# Patient Record
Sex: FEMALE | Race: WHITE
[De-identification: names, ages, dates, MRNs, and addresses within clinical notes are randomized per-mention and may not be internally consistent; named-entity substitution may affect disease eponyms.]

---

## 2019-01-09 ENCOUNTER — HOSPITAL ENCOUNTER (EMERGENCY)
Dept: HOSPITAL 8 - ED | Age: 49
Discharge: LEFT BEFORE DISCHARGE | End: 2019-01-09
Payer: COMMERCIAL

## 2019-01-09 VITALS — BODY MASS INDEX: 23.46 KG/M2 | HEIGHT: 68 IN | WEIGHT: 154.76 LBS

## 2019-01-09 VITALS — SYSTOLIC BLOOD PRESSURE: 128 MMHG | DIASTOLIC BLOOD PRESSURE: 83 MMHG
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
## 2019-01-09 DIAGNOSIS — S16.1XXA: Primary | ICD-10-CM
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
## 2019-01-09 PROCEDURE — 99281 EMR DPT VST MAYX REQ PHY/QHP: CPT

## 2019-01-09 NOTE — NUR
PA TO SEE PT, PT BECAME VERY ANGRY AND ARGUMENTATIVE, DEMANDING MRI. PA 
ATTEMPTED TO EXPLAIN TO PT THAT MRI IS NOT AN EMERGENCY AND SHOULD BE HANDLED 
OUT PT, HAPPY TO WRITE A REFERAL. PT STORMED OUT OF ROOM.

## 2019-11-14 ENCOUNTER — HOSPITAL ENCOUNTER (EMERGENCY)
Dept: HOSPITAL 8 - ED | Age: 49
Discharge: HOME | End: 2019-11-14
Payer: COMMERCIAL

## 2019-11-14 VITALS — BODY MASS INDEX: 25.23 KG/M2 | WEIGHT: 166.45 LBS | HEIGHT: 68 IN

## 2019-11-14 VITALS — SYSTOLIC BLOOD PRESSURE: 120 MMHG | DIASTOLIC BLOOD PRESSURE: 76 MMHG

## 2019-11-14 DIAGNOSIS — S61.212A: Primary | ICD-10-CM
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
## 2019-11-14 PROCEDURE — 12001 RPR S/N/AX/GEN/TRNK 2.5CM/<: CPT

## 2019-11-14 PROCEDURE — 99283 EMERGENCY DEPT VISIT LOW MDM: CPT

## 2019-11-14 PROCEDURE — 90471 IMMUNIZATION ADMIN: CPT

## 2019-11-14 PROCEDURE — 90715 TDAP VACCINE 7 YRS/> IM: CPT

## 2019-11-16 ENCOUNTER — HOSPITAL ENCOUNTER (EMERGENCY)
Dept: HOSPITAL 8 - ED | Age: 49
Discharge: HOME | End: 2019-11-16
Payer: COMMERCIAL

## 2019-11-16 VITALS — HEIGHT: 68 IN | BODY MASS INDEX: 25.63 KG/M2 | WEIGHT: 169.09 LBS

## 2019-11-16 VITALS — DIASTOLIC BLOOD PRESSURE: 75 MMHG | SYSTOLIC BLOOD PRESSURE: 134 MMHG
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
## 2019-11-16 DIAGNOSIS — F17.200: ICD-10-CM

## 2019-11-16 DIAGNOSIS — S60.132A: ICD-10-CM
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
## 2019-11-16 DIAGNOSIS — S60.142A: Primary | ICD-10-CM

## 2019-11-16 PROCEDURE — 11740 EVACUATION SUBUNGUAL HMTMA: CPT

## 2019-11-16 NOTE — NUR
PT PRESENTED WITH C/O PAIN AND SWELLING TO LEFT 3RD AND 4TH FINGERS, WAS SEEN 
HERE 11/14 AFTER HAVING INJURY TO FINGERS, BOTH FX AND LACERATION.  IS TAKING 
KEFLEX. PT RESTING ON GURNEY, MONITORS APPLIED, SIDERAILS UP X2, CALL LIGHT 
WITHIN REACH. AWAITING ERP FOR EVAL AND ORDERS

## 2019-11-21 ENCOUNTER — HOSPITAL ENCOUNTER (EMERGENCY)
Dept: HOSPITAL 8 - ED | Age: 49
Discharge: HOME | End: 2019-11-21
Payer: COMMERCIAL

## 2019-11-21 VITALS — HEIGHT: 68 IN | BODY MASS INDEX: 24.86 KG/M2 | WEIGHT: 164.02 LBS

## 2019-11-21 VITALS — DIASTOLIC BLOOD PRESSURE: 68 MMHG | SYSTOLIC BLOOD PRESSURE: 110 MMHG
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
## 2019-11-21 DIAGNOSIS — S61.213D: Primary | ICD-10-CM

## 2019-11-21 PROCEDURE — 99281 EMR DPT VST MAYX REQ PHY/QHP: CPT

## 2019-11-21 NOTE — NUR
FIRST CONTACT WITH PT. PT HERE FOR SUTURE REMOVAL ON LEFT MIDDLE FINGER- WERE 
PLACED LAST THURSDAY. PT'S AOX4. RESPS EVEN AND UNLABORED.

## 2021-07-31 ENCOUNTER — HOSPITAL ENCOUNTER (EMERGENCY)
Dept: HOSPITAL 8 - ED | Age: 51
Discharge: HOME | End: 2021-07-31
Payer: SELF-PAY

## 2021-07-31 VITALS — DIASTOLIC BLOOD PRESSURE: 92 MMHG | SYSTOLIC BLOOD PRESSURE: 126 MMHG
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
## 2021-07-31 DIAGNOSIS — S92.512A: Primary | ICD-10-CM
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
## 2021-07-31 DIAGNOSIS — F10.220: ICD-10-CM
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
## 2021-07-31 DIAGNOSIS — S20.219A: ICD-10-CM

## 2021-07-31 LAB
ALBUMIN SERPL-MCNC: 3.9 G/DL (ref 3.4–5)
ALP SERPL-CCNC: 88 U/L (ref 45–117)
ALT SERPL-CCNC: 74 U/L (ref 12–78)
ANION GAP SERPL CALC-SCNC: 8 MMOL/L (ref 5–15)
BASOPHILS # BLD AUTO: 0.1 X10^3/UL (ref 0–0.1)
BASOPHILS NFR BLD AUTO: 1 % (ref 0–1)
BILIRUB SERPL-MCNC: 0.6 MG/DL (ref 0.2–1)
CALCIUM SERPL-MCNC: 9 MG/DL (ref 8.5–10.1)
CHLORIDE SERPL-SCNC: 109 MMOL/L (ref 98–107)
CREAT SERPL-MCNC: 0.76 MG/DL (ref 0.55–1.02)
EOSINOPHIL # BLD AUTO: 0.1 X10^3/UL (ref 0–0.4)
EOSINOPHIL NFR BLD AUTO: 2 % (ref 1–7)
ERYTHROCYTE [DISTWIDTH] IN BLOOD BY AUTOMATED COUNT: 14.9 % (ref 9.6–15.2)
LYMPHOCYTES # BLD AUTO: 1.6 X10^3/UL (ref 1–3.4)
LYMPHOCYTES NFR BLD AUTO: 29 % (ref 22–44)
MCH RBC QN AUTO: 32.9 PG (ref 27–34.8)
MCHC RBC AUTO-ENTMCNC: 34.6 G/DL (ref 32.4–35.8)
MONOCYTES # BLD AUTO: 0.5 X10^3/UL (ref 0.2–0.8)
MONOCYTES NFR BLD AUTO: 10 % (ref 2–9)
NEUTROPHILS # BLD AUTO: 3.3 X10^3/UL (ref 1.8–6.8)
NEUTROPHILS NFR BLD AUTO: 58 % (ref 42–75)
PLATELET # BLD AUTO: 240 X10^3/UL (ref 130–400)
PMV BLD AUTO: 8.1 FL (ref 7.4–10.4)
PROT SERPL-MCNC: 7.8 G/DL (ref 6.4–8.2)
RBC # BLD AUTO: 4.63 X10^6/UL (ref 3.82–5.3)

## 2021-07-31 PROCEDURE — 71045 X-RAY EXAM CHEST 1 VIEW: CPT

## 2021-07-31 PROCEDURE — 85025 COMPLETE CBC W/AUTO DIFF WBC: CPT

## 2021-07-31 PROCEDURE — 36415 COLL VENOUS BLD VENIPUNCTURE: CPT

## 2021-07-31 PROCEDURE — 99284 EMERGENCY DEPT VISIT MOD MDM: CPT

## 2021-07-31 PROCEDURE — 80053 COMPREHEN METABOLIC PANEL: CPT

## 2021-07-31 PROCEDURE — 80320 DRUG SCREEN QUANTALCOHOLS: CPT

## 2021-07-31 PROCEDURE — G0480 DRUG TEST DEF 1-7 CLASSES: HCPCS

## 2021-07-31 NOTE — NUR
PT GIVEN DISCHARGE INSTRUCTIONS AND INSTRUCTIONS TO WAIT FOR [facility] SHOE FROM 
[facility]. PT RESTING IN TOMER PRYOR AT THIS TIME.

## 2021-07-31 NOTE — NUR
[facility] SHOE TUBED FROM [facility]. PT LEFT WITHOUT GETTING SHOE, STATING 
SHE PROBABLY WOULDN'T WEAR IT ANYWAY.